# Patient Record
Sex: FEMALE | Race: BLACK OR AFRICAN AMERICAN | NOT HISPANIC OR LATINO | URBAN - METROPOLITAN AREA
[De-identification: names, ages, dates, MRNs, and addresses within clinical notes are randomized per-mention and may not be internally consistent; named-entity substitution may affect disease eponyms.]

---

## 2017-09-07 ENCOUNTER — FOLLOW UP ESTABLISHED (OUTPATIENT)
Dept: URBAN - METROPOLITAN AREA CLINIC 10 | Facility: CLINIC | Age: 72
End: 2017-09-07
Payer: MEDICARE

## 2017-09-07 DIAGNOSIS — H40.1131 PRIMARY OPEN-ANGLE GLAUCOMA, BILATERAL, MILD STAGE: Primary | ICD-10-CM

## 2017-09-07 PROCEDURE — 99213 OFFICE O/P EST LOW 20 MIN: CPT | Performed by: OPHTHALMOLOGY

## 2017-09-07 PROCEDURE — 92250 FUNDUS PHOTOGRAPHY W/I&R: CPT | Performed by: OPHTHALMOLOGY

## 2017-09-07 ASSESSMENT — INTRAOCULAR PRESSURE
OS: 14
OD: 15

## 2018-04-03 ENCOUNTER — FOLLOW UP ESTABLISHED (OUTPATIENT)
Dept: URBAN - METROPOLITAN AREA CLINIC 24 | Facility: CLINIC | Age: 73
End: 2018-04-03
Payer: MEDICARE

## 2018-04-03 PROCEDURE — 92012 INTRM OPH EXAM EST PATIENT: CPT | Performed by: OPHTHALMOLOGY

## 2018-04-03 PROCEDURE — 92083 EXTENDED VISUAL FIELD XM: CPT | Performed by: OPHTHALMOLOGY

## 2018-04-03 ASSESSMENT — INTRAOCULAR PRESSURE
OD: 15
OS: 15

## 2019-04-15 ENCOUNTER — FOLLOW UP ESTABLISHED (OUTPATIENT)
Dept: URBAN - METROPOLITAN AREA CLINIC 10 | Facility: CLINIC | Age: 74
End: 2019-04-15
Payer: MEDICARE

## 2019-04-15 PROCEDURE — 92012 INTRM OPH EXAM EST PATIENT: CPT | Performed by: OPHTHALMOLOGY

## 2019-04-15 ASSESSMENT — INTRAOCULAR PRESSURE
OS: 13
OD: 14

## 2019-10-01 ENCOUNTER — FOLLOW UP ESTABLISHED (OUTPATIENT)
Dept: URBAN - METROPOLITAN AREA CLINIC 24 | Facility: CLINIC | Age: 74
End: 2019-10-01
Payer: MEDICARE

## 2019-10-01 PROCEDURE — 92012 INTRM OPH EXAM EST PATIENT: CPT | Performed by: OPHTHALMOLOGY

## 2019-10-01 PROCEDURE — 92083 EXTENDED VISUAL FIELD XM: CPT | Performed by: OPHTHALMOLOGY

## 2019-10-01 ASSESSMENT — INTRAOCULAR PRESSURE
OD: 18
OS: 16

## 2020-01-20 ENCOUNTER — FOLLOW UP ESTABLISHED (OUTPATIENT)
Dept: URBAN - METROPOLITAN AREA CLINIC 44 | Facility: CLINIC | Age: 75
End: 2020-01-20
Payer: MEDICARE

## 2020-01-20 PROCEDURE — 92012 INTRM OPH EXAM EST PATIENT: CPT | Performed by: OPHTHALMOLOGY

## 2020-01-20 PROCEDURE — 92133 CPTRZD OPH DX IMG PST SGM ON: CPT | Performed by: OPHTHALMOLOGY

## 2020-01-20 RX ORDER — LATANOPROST 50 UG/ML
0.005 % SOLUTION OPHTHALMIC
Qty: 3 | Refills: 3 | Status: INACTIVE
Start: 2020-01-20 | End: 2020-11-23

## 2020-01-20 RX ORDER — TIMOLOL MALEATE 5 MG/ML
0.5 % SOLUTION/ DROPS OPHTHALMIC
Qty: 3 | Refills: 3 | Status: INACTIVE
Start: 2020-01-20 | End: 2021-03-15

## 2020-01-20 ASSESSMENT — INTRAOCULAR PRESSURE
OS: 17
OD: 19

## 2020-05-19 ENCOUNTER — FOLLOW UP ESTABLISHED (OUTPATIENT)
Dept: URBAN - METROPOLITAN AREA CLINIC 24 | Facility: CLINIC | Age: 75
End: 2020-05-19
Payer: MEDICARE

## 2020-05-19 PROCEDURE — 92250 FUNDUS PHOTOGRAPHY W/I&R: CPT | Performed by: OPHTHALMOLOGY

## 2020-05-19 PROCEDURE — 92012 INTRM OPH EXAM EST PATIENT: CPT | Performed by: OPHTHALMOLOGY

## 2020-05-19 ASSESSMENT — INTRAOCULAR PRESSURE
OD: 14
OS: 14

## 2020-12-01 ENCOUNTER — FOLLOW UP ESTABLISHED (OUTPATIENT)
Dept: URBAN - METROPOLITAN AREA CLINIC 24 | Facility: CLINIC | Age: 75
End: 2020-12-01
Payer: MEDICARE

## 2020-12-01 PROCEDURE — 92083 EXTENDED VISUAL FIELD XM: CPT | Performed by: OPHTHALMOLOGY

## 2020-12-01 PROCEDURE — 92012 INTRM OPH EXAM EST PATIENT: CPT | Performed by: OPHTHALMOLOGY

## 2020-12-01 ASSESSMENT — INTRAOCULAR PRESSURE
OD: 14
OS: 12

## 2021-05-25 ENCOUNTER — OFFICE VISIT (OUTPATIENT)
Dept: URBAN - METROPOLITAN AREA CLINIC 24 | Facility: CLINIC | Age: 76
End: 2021-05-25
Payer: MEDICARE

## 2021-05-25 PROCEDURE — 99213 OFFICE O/P EST LOW 20 MIN: CPT | Performed by: OPHTHALMOLOGY

## 2021-05-25 PROCEDURE — 92133 CPTRZD OPH DX IMG PST SGM ON: CPT | Performed by: OPHTHALMOLOGY

## 2021-05-25 ASSESSMENT — INTRAOCULAR PRESSURE
OS: 13
OD: 14

## 2021-05-25 NOTE — IMPRESSION/PLAN
Impression: Primary open-angle glaucoma, mild stage, bilateral Plan: PT HAS POAG OU  AND PXG OD         PACHS: 552 // 548 (01/15/10) REFERRED BY DR. MCDONOUGH OD
TARGET IOP LOW TEENS OR LESS OU
PT DENIES LUNG DZ
PT DENIES SULFA ALLERGY
RECOMMEND 1. CONTINUE LATANAPROST  OU QPM
2. CONTINUE TIMOLOL 1/2 OD QAM
3. F/U IOP CHECK 6  MONTHS WITH CONSIDERATION OF SLT IF IOP REMAINS ABOVE LOW TEENS
4. F/U WITH DR. MCDONOUGH FOR COMPREHENSIVE CARE

## 2021-12-07 ENCOUNTER — OFFICE VISIT (OUTPATIENT)
Dept: URBAN - METROPOLITAN AREA CLINIC 24 | Facility: CLINIC | Age: 76
End: 2021-12-07
Payer: MEDICARE

## 2021-12-07 PROCEDURE — 92083 EXTENDED VISUAL FIELD XM: CPT | Performed by: OPHTHALMOLOGY

## 2021-12-07 PROCEDURE — 99214 OFFICE O/P EST MOD 30 MIN: CPT | Performed by: OPHTHALMOLOGY

## 2021-12-07 ASSESSMENT — INTRAOCULAR PRESSURE
OD: 17
OS: 16

## 2021-12-07 NOTE — IMPRESSION/PLAN
Impression: Primary open-angle glaucoma, mild stage, bilateral Plan: PT HAS POAG OU  AND PXG OD         PACHS: 552 // 548 (01/15/10) REFERRED BY DR. MCDONOUGH OD
TARGET IOP LOW TEENS OR LESS OU
PT DENIES LUNG DZ
PT DENIES SULFA ALLERGY
RECOMMEND 1. CONTINUE LATANAPROST  OU QPM
2. CONTINUE TIMOLOL 1/2 OD QAM
3. THE PT HAS DEVELOPED VISUALLY SIGNIFICANT CATARACT OD AND EARLY CATARACT OS
4. THE PT WOULD LIKE TO PROCEED WITH CATARACT EVALUATION WITH STRONG CONSIDERATION OF CATARACT SURGERY OD AND IF NEEDED OS. SCHEDULE CATARACT EVALUATION IN PHX OFFICE WITH DR ALARCON OR DR. DÍAZ. THE PT HAS PSEUDOEXFOLIATION OD WHICH NEEDS TO BE CAREFULLY MONITORED WHEN SHE PROCEEDS WITH CATARACT SURGERY OD
5. CONTINUE CARE  WITH DR. MCDONOUGH FOLLOWING CATARACT SURGERY

## 2021-12-09 ENCOUNTER — OFFICE VISIT (OUTPATIENT)
Dept: URBAN - METROPOLITAN AREA CLINIC 10 | Facility: CLINIC | Age: 76
End: 2021-12-09
Payer: MEDICARE

## 2021-12-09 DIAGNOSIS — H43.813 VITREOUS DEGENERATION, BILATERAL: ICD-10-CM

## 2021-12-09 PROCEDURE — 99204 OFFICE O/P NEW MOD 45 MIN: CPT | Performed by: OPTOMETRIST

## 2021-12-09 ASSESSMENT — VISUAL ACUITY
OS: 20/20
OD: 20/40

## 2021-12-09 ASSESSMENT — INTRAOCULAR PRESSURE
OS: 16
OD: 16

## 2021-12-09 NOTE — IMPRESSION/PLAN
Impression: Combined forms of age-related cataract, bilateral: H25.813. Plan:  Discussed cataracts with patient. Discussed treatment options. Surgical treatment is recommended. Surgical risks and benefits discussed. Patient elects surgical treatment. Recommend surgery OU, OD first. Patient is candidate/interested in standard lens, ORA, LenSx, toric lens/astigmatism correction. Aim OD: -0.25. Aim OS: -0.25. Consider MIGS. Glaucoma surgeon recommended. Needs Retina Clearance. Patient will need glasses for full time wear. Patient will need glasses for all near work, including computer. Patient advised of implications of lost myopia. Patient will need glasses for distance work (if near aim).

## 2021-12-09 NOTE — IMPRESSION/PLAN
Impression: Primary open-angle glaucoma, mild stage, bilateral Plan: PT HAS POAG OU  AND PXG OD         PACHS: 552 // 548 (01/15/10) REFERRED BY DR. MCDONOUGH OD
TARGET IOP LOW TEENS OR LESS OU
PT DENIES LUNG DZ
PT DENIES SULFA ALLERGY
RECOMMEND 1. CONTINUE LATANAPROST  OU QPM
2. CONTINUE TIMOLOL 1/2 OD QAM
3. THE PT HAS PSEUDOEXFOLIATION OD WHICH NEEDS TO BE CAREFULLY MONITORED WHEN SHE PROCEEDS WITH CATARACT SURGERY OD
4. CONTINUE GLAUCOMA CARE WITH DR. Shadi Queen
5. CONSIDER MIGS, GLAUCOMA SURGEON RECOMMENDED 6. CONTINUE CARE WITH DR. MCDONOUGH FOLLOWING CATARACT SURGERY

## 2022-01-06 ENCOUNTER — OFFICE VISIT (OUTPATIENT)
Dept: URBAN - METROPOLITAN AREA CLINIC 10 | Facility: CLINIC | Age: 77
End: 2022-01-06
Payer: MEDICARE

## 2022-01-06 DIAGNOSIS — H40.1133 PRIMARY OPEN-ANGLE GLAUCOMA, SEVERE STAGE, BILATERAL: ICD-10-CM

## 2022-01-06 PROCEDURE — 92134 CPTRZ OPH DX IMG PST SGM RTA: CPT | Performed by: OPHTHALMOLOGY

## 2022-01-06 PROCEDURE — 99214 OFFICE O/P EST MOD 30 MIN: CPT | Performed by: OPHTHALMOLOGY

## 2022-01-06 ASSESSMENT — INTRAOCULAR PRESSURE
OS: 24
OD: 25

## 2022-01-06 NOTE — IMPRESSION/PLAN
Impression: Age-related nuclear cataract, bilateral: H25.13.  Plan: remove both cataracts - DR Tripp Holt

## 2022-01-06 NOTE — IMPRESSION/PLAN
Impression: Primary open-angle glaucoma, severe stage, bilateral: H40.1133.  Plan: DR Hart Redbruceh drops continue

## 2022-03-07 ENCOUNTER — TESTING ONLY (OUTPATIENT)
Dept: URBAN - METROPOLITAN AREA CLINIC 10 | Facility: CLINIC | Age: 77
End: 2022-03-07
Payer: MEDICARE

## 2022-03-07 DIAGNOSIS — Z01.818 ENCOUNTER FOR OTHER PREPROCEDURAL EXAMINATION: Primary | ICD-10-CM

## 2022-03-07 PROCEDURE — 99202 OFFICE O/P NEW SF 15 MIN: CPT | Performed by: PHYSICIAN ASSISTANT

## 2022-03-15 ENCOUNTER — OFFICE VISIT (OUTPATIENT)
Dept: URBAN - METROPOLITAN AREA CLINIC 10 | Facility: CLINIC | Age: 77
End: 2022-03-15
Payer: MEDICARE

## 2022-03-15 DIAGNOSIS — H25.813 COMBINED FORMS OF AGE-RELATED CATARACT, BILATERAL: Primary | ICD-10-CM

## 2022-03-15 DIAGNOSIS — H52.203 UNSPECIFIED ASTIGMATISM, BILATERAL: ICD-10-CM

## 2022-03-15 DIAGNOSIS — H52.13 MYOPIA, BILATERAL: ICD-10-CM

## 2022-03-15 PROCEDURE — 92020 GONIOSCOPY: CPT | Performed by: OPHTHALMOLOGY

## 2022-03-15 PROCEDURE — 99214 OFFICE O/P EST MOD 30 MIN: CPT | Performed by: OPHTHALMOLOGY

## 2022-03-15 RX ORDER — DICLOFENAC SODIUM 1 MG/ML
0.1 % SOLUTION/ DROPS OPHTHALMIC
Qty: 5 | Refills: 2 | Status: INACTIVE
Start: 2022-03-15 | End: 2022-03-15

## 2022-03-15 RX ORDER — PREDNISOLONE ACETATE 10 MG/ML
1 % SUSPENSION/ DROPS OPHTHALMIC
Qty: 5 | Refills: 2 | Status: INACTIVE
Start: 2022-03-15 | End: 2022-03-15

## 2022-03-15 RX ORDER — DORZOLAMIDE HYDROCHLORIDE AND TIMOLOL MALEATE 20; 5 MG/ML; MG/ML
SOLUTION/ DROPS OPHTHALMIC
Qty: 15 | Refills: 2 | Status: ACTIVE
Start: 2022-03-15

## 2022-03-15 ASSESSMENT — VISUAL ACUITY
OS: 20/20
OD: 20/40

## 2022-03-15 ASSESSMENT — KERATOMETRY
OD: 43.25
OS: 44.13

## 2022-03-15 ASSESSMENT — INTRAOCULAR PRESSURE
OD: 36
OS: 32

## 2022-03-15 NOTE — IMPRESSION/PLAN
Impression: Combined forms of age-related cataract, bilateral: H25.813. 
Pseudoexfoliation  Plan: See [address]: cancel cat surgery , will need to adjust drops first

## 2022-03-15 NOTE — IMPRESSION/PLAN
Impression: Primary open-angle glaucoma, severe stage, bilateral: H40.3893. (unknown) Family Hx of Glaucoma, (-) Sulfa Allergy, (-)Heart or Lung Problems, (-) Sleep Apnea,  (-) Hx of Migraines  ; PEX OU  Plan: PLAN: On Timolol QAM OD, and Latanoprost QHS OU, test reviewed, IOP is too elevated, hold on surgery for now, adjust meds: use xal qhs ou, stop piotr ,add cosopt bid ou and rtc in 3-5 days for iop recheck ; then will schedule eval w Dr Lucille Pablo for cat sx/migs ;   gtt sheet given 3/15/22 TESTS: Reviewed Discussed Glaucoma diagnosis in detail with patient. Emphasized and explain complaince. poor compliance can lead to blindness.

## 2022-03-21 ENCOUNTER — OFFICE VISIT (OUTPATIENT)
Dept: URBAN - METROPOLITAN AREA CLINIC 10 | Facility: CLINIC | Age: 77
End: 2022-03-21
Payer: MEDICARE

## 2022-03-21 PROCEDURE — 99214 OFFICE O/P EST MOD 30 MIN: CPT | Performed by: STUDENT IN AN ORGANIZED HEALTH CARE EDUCATION/TRAINING PROGRAM

## 2022-03-21 ASSESSMENT — INTRAOCULAR PRESSURE
OD: 23
OS: 14

## 2022-03-21 NOTE — IMPRESSION/PLAN
Impression: Combined forms of age-related cataract, bilateral: H25.813. 
Pseudoexfoliation Plan: Once pressures better controlled okay to proceed with sx

## 2022-03-21 NOTE — IMPRESSION/PLAN
Impression: Primary open-angle glaucoma, severe stage, bilateral: H40.1133. (unknown) Family Hx of Glaucoma, (-) Sulfa Allergy, (-)Heart or Lung Problems, (-) Sleep Apnea,  (-) Hx of Migraines  ; PEX OU Plan: PLAN: On Cosopt BID OU, and Latanoprost QHS OU, test reviewed, IOP is too elevated, hold on surgery for now. IOP lowered to a safer level today (slightly elevated OD still) Continue as scheduled with Dr. Gemraine Feng

## 2022-03-30 ENCOUNTER — OFFICE VISIT (OUTPATIENT)
Dept: URBAN - METROPOLITAN AREA CLINIC 30 | Facility: CLINIC | Age: 77
End: 2022-03-30
Payer: MEDICARE

## 2022-03-30 DIAGNOSIS — H25.13 AGE-RELATED NUCLEAR CATARACT, BILATERAL: ICD-10-CM

## 2022-03-30 DIAGNOSIS — H04.123 DRY EYE SYNDROME OF BILATERAL LACRIMAL GLANDS: ICD-10-CM

## 2022-03-30 PROCEDURE — 99214 OFFICE O/P EST MOD 30 MIN: CPT | Performed by: OPHTHALMOLOGY

## 2022-03-30 PROCEDURE — 76514 ECHO EXAM OF EYE THICKNESS: CPT | Performed by: OPHTHALMOLOGY

## 2022-03-30 PROCEDURE — 92020 GONIOSCOPY: CPT | Performed by: OPHTHALMOLOGY

## 2022-03-30 ASSESSMENT — INTRAOCULAR PRESSURE
OS: 26
OD: 13

## 2022-03-30 NOTE — IMPRESSION/PLAN
Impression: Age-related nuclear cataract, bilateral: H25.13. Plan: Pt would like to try new drop regime for 1 month then if IOP still not under control plan for below. (ODTHEN OS)( NEAR -2.50-3.00 AIM Os:  DEXYCU 1st choice, + Block ORA?, LenSx?, + MIGS OU (OMNI WITH PARTIAL OTOMY, HYDRUS), TORIC?))

10 - Min Discussed cataract diagnosis with the patient. Appropriate testing ordered for cataract diagnosis prior to Preop. Risks and benefits of surgical treatment were discussed and understood. Patient desires surgical treatment. Discussed lens options with pt and pt is ok with wearing glasses after surgery. Patient desires surgery to proceed with surgery OD THEN OS. Both eyes examined, medically necessary due to impact in activities of daily living. Discussed with pt limitations of MIGS device and it does not replace  Glaucoma eye drops and would have to continue treatment and would still need glasses after surgery. Discussed higher risks with smaller pupil and discussed iris stretch and higher risks of bleeding.  Discussed there is a chance of developing capsular haze after surgery, which may be corrected with laser/yag

## 2022-03-30 NOTE — IMPRESSION/PLAN
Impression: Primary open-angle glaucoma, severe stage, bilateral: H40.1133.
(+)PEX OU Plan: Pt has Glaucoma    Gonio : ss 3+ ou       Pachs: 532/540      Today's IOP :  13/26       Tmax  :  36/32 Target IOP mid teens or less ou Pt denies Fhx of Glaucoma Left eye is the better seeing eye HVF OD Dense loss OS Central change C/D: .9x.9 // .8x.8
OCT: 77/73 Pt denies Sulfa Allergy // Pt denies Lung /Heart dx Plan :
1. Continue Cosopt BID OU - just started with Dr. Nieves [place] Latanoprost QHS OU Discussed details about Glaucoma and that without proper control of pressures irreversible blindness can occur. Patient understands risks. Emphasize compliance with drop and without compliance vision loss progression can occur. 
2. IOP elevated OS - will check IOP in 1 month and if not under control consider other options

## 2022-04-29 ENCOUNTER — OFFICE VISIT (OUTPATIENT)
Dept: URBAN - METROPOLITAN AREA CLINIC 10 | Facility: CLINIC | Age: 77
End: 2022-04-29
Payer: MEDICARE

## 2022-04-29 DIAGNOSIS — H40.1133 PRIMARY OPEN-ANGLE GLAUCOMA, SEVERE STAGE, BILATERAL: Primary | ICD-10-CM

## 2022-04-29 DIAGNOSIS — H25.12 AGE-RELATED NUCLEAR CATARACT, LEFT EYE: ICD-10-CM

## 2022-04-29 DIAGNOSIS — H25.13 AGE-RELATED NUCLEAR CATARACT, BILATERAL: ICD-10-CM

## 2022-04-29 PROCEDURE — 99214 OFFICE O/P EST MOD 30 MIN: CPT | Performed by: OPHTHALMOLOGY

## 2022-04-29 ASSESSMENT — INTRAOCULAR PRESSURE
OS: 13
OD: 23

## 2022-04-29 NOTE — IMPRESSION/PLAN
Impression: Age-related nuclear cataract, bilateral: H25.13.
h/o CL use Plan: (OD THEN OS)( NEAR -2.50 to -3.00 AIM OU:  DEXYCU , + Block, + MIGS OU (OMNI WITH PARTIAL OTOMY, HYDRUS),- 10 Min No Ora, No Lensx, No TORIC Patient declines all upgrades)) Discussed cataract diagnosis with the patient. Appropriate testing ordered for cataract diagnosis prior to Preop. Risks and benefits of surgical treatment were discussed and understood. Patient desires surgical treatment. Discussed lens options with pt and pt is ok with wearing glasses after surgery. Patient desires surgery to proceed with surgery OD THEN OS. Both eyes examined, medically necessary due to impact in activities of daily living. Discussed with pt limitations of MIGS device and it does not replace  Glaucoma eye drops and would have to continue treatment and would still need glasses after surgery. Discussed higher risks with smaller pupil and discussed iris stretch and higher risks of bleeding.  Discussed there is a chance of developing capsular haze after surgery, which may be corrected with laser/yag

## 2022-04-29 NOTE — IMPRESSION/PLAN
Impression: Primary open-angle glaucoma, severe stage, bilateral: H40.1133.
(+)PEX OU Plan: Pt has Glaucoma    Gonio : ss 3+ ou       Pachs: 532/540      Today's IOP :  23/13       Tmax  :  36/32 Target IOP mid teens or less ou Pt denies Fhx of Glaucoma Left eye is the better seeing eye HVF OD Dense loss OS Central change C/D: .9x.9 // .8x.8
OCT: 77/73 Pt denies Sulfa Allergy // Pt denies Lung /Heart dx Plan :
1. Continue Cosopt BID OU Latanoprost QHS OU Discussed details about Glaucoma and that without proper control of pressures irreversible blindness can occur. Patient understands risks. Emphasize compliance with drop and without compliance vision loss progression can occur. 
2. IOP elevated OD - will plan CEIOL with MIGS OD

## 2022-05-16 ENCOUNTER — ADULT PHYSICAL (OUTPATIENT)
Dept: URBAN - METROPOLITAN AREA CLINIC 10 | Facility: CLINIC | Age: 77
End: 2022-05-16
Payer: MEDICARE

## 2022-05-16 DIAGNOSIS — Z01.818 ENCOUNTER FOR OTHER PREPROCEDURAL EXAMINATION: Primary | ICD-10-CM

## 2022-05-16 PROCEDURE — 99213 OFFICE O/P EST LOW 20 MIN: CPT | Performed by: PHYSICIAN ASSISTANT

## 2022-05-23 ENCOUNTER — SURGERY (OUTPATIENT)
Dept: URBAN - METROPOLITAN AREA SURGERY 5 | Facility: SURGERY | Age: 77
End: 2022-05-23
Payer: MEDICARE

## 2022-05-23 PROCEDURE — 66174 TRLUML DIL AQ O/F CAN W/O ST: CPT | Performed by: OPHTHALMOLOGY

## 2022-05-24 ENCOUNTER — POST-OPERATIVE VISIT (OUTPATIENT)
Dept: URBAN - METROPOLITAN AREA CLINIC 10 | Facility: CLINIC | Age: 77
End: 2022-05-24
Payer: MEDICARE

## 2022-05-24 DIAGNOSIS — Z96.1 PRESENCE OF INTRAOCULAR LENS: Primary | ICD-10-CM

## 2022-05-24 PROCEDURE — 99024 POSTOP FOLLOW-UP VISIT: CPT | Performed by: OPTOMETRIST

## 2022-05-24 ASSESSMENT — INTRAOCULAR PRESSURE: OD: 16

## 2022-05-31 ENCOUNTER — POST-OPERATIVE VISIT (OUTPATIENT)
Dept: URBAN - METROPOLITAN AREA CLINIC 10 | Facility: CLINIC | Age: 77
End: 2022-05-31
Payer: MEDICARE

## 2022-05-31 DIAGNOSIS — Z96.1 PRESENCE OF INTRAOCULAR LENS: Primary | ICD-10-CM

## 2022-05-31 PROCEDURE — 99024 POSTOP FOLLOW-UP VISIT: CPT | Performed by: STUDENT IN AN ORGANIZED HEALTH CARE EDUCATION/TRAINING PROGRAM

## 2022-05-31 RX ORDER — DORZOLAMIDE HYDROCHLORIDE AND TIMOLOL MALEATE 20; 5 MG/ML; MG/ML
SOLUTION/ DROPS OPHTHALMIC
Qty: 15 | Refills: 2 | Status: ACTIVE
Start: 2022-05-31

## 2022-05-31 ASSESSMENT — INTRAOCULAR PRESSURE
OD: 16
OS: 16

## 2022-05-31 NOTE — IMPRESSION/PLAN
Impression:  Presence of intraocular lens  Z96.1. Post operative instructions reviewed - Plan: Reviewed post-op instructions. RTC if any pain or sudden changes to vision. 
Vision remains reduced but Mac OCT WNL, trace haze but off axis, very poor TF with PEK OD

## 2022-06-16 ENCOUNTER — SURGERY (OUTPATIENT)
Dept: URBAN - METROPOLITAN AREA SURGERY 5 | Facility: SURGERY | Age: 77
End: 2022-06-16
Payer: MEDICARE

## 2022-06-16 DIAGNOSIS — H25.12 AGE-RELATED NUCLEAR CATARACT, LEFT EYE: Primary | ICD-10-CM

## 2022-06-16 DIAGNOSIS — H40.1133 PRIMARY OPEN-ANGLE GLAUCOMA, BILATERAL, SEVERE STAGE: ICD-10-CM

## 2022-06-16 PROCEDURE — 66174 TRLUML DIL AQ O/F CAN W/O ST: CPT | Performed by: OPHTHALMOLOGY

## 2022-06-17 ENCOUNTER — POST-OPERATIVE VISIT (OUTPATIENT)
Dept: URBAN - METROPOLITAN AREA CLINIC 10 | Facility: CLINIC | Age: 77
End: 2022-06-17
Payer: MEDICARE

## 2022-06-17 DIAGNOSIS — Z48.810 ENCOUNTER FOR SURGICAL AFTERCARE FOLLOWING SURGERY ON A SENSE ORGAN: Primary | ICD-10-CM

## 2022-06-17 PROCEDURE — 99024 POSTOP FOLLOW-UP VISIT: CPT | Performed by: STUDENT IN AN ORGANIZED HEALTH CARE EDUCATION/TRAINING PROGRAM

## 2022-06-17 ASSESSMENT — INTRAOCULAR PRESSURE: OS: 14

## 2022-06-17 NOTE — IMPRESSION/PLAN
Impression: S/P Cataract Extraction by phacoemulsification with IOL placement; OMNI/Hydrus OS - 1 Day. Encounter for surgical aftercare following surgery on a sense organ  Z48.810. Post operative instructions reviewed - Plan: Reviewed post-op instructions. RTC if any pain or sudden changes to vision.

## 2022-07-07 ENCOUNTER — POST-OPERATIVE VISIT (OUTPATIENT)
Dept: URBAN - METROPOLITAN AREA CLINIC 10 | Facility: CLINIC | Age: 77
End: 2022-07-07
Payer: MEDICARE

## 2022-07-07 DIAGNOSIS — Z96.1 PRESENCE OF INTRAOCULAR LENS: Primary | ICD-10-CM

## 2022-07-07 DIAGNOSIS — Z48.810 ENCOUNTER FOR SURGICAL AFTERCARE FOLLOWING SURGERY ON THE SENSE ORGANS: ICD-10-CM

## 2022-07-07 DIAGNOSIS — H40.1133 PRIMARY OPEN-ANGLE GLAUCOMA, SEVERE STAGE, BILATERAL: ICD-10-CM

## 2022-07-07 PROCEDURE — 99024 POSTOP FOLLOW-UP VISIT: CPT | Performed by: OPTOMETRIST

## 2022-07-07 ASSESSMENT — INTRAOCULAR PRESSURE
OS: 17
OD: 15

## 2022-07-07 ASSESSMENT — VISUAL ACUITY
OD: 20/200
OS: 20/25

## 2022-07-07 NOTE — IMPRESSION/PLAN
Impression: Presence of intraocular lens  Z96.1. Post operative instructions reviewed - Plan: Healed well OU. Glaucoma limits BCVA OD. Mac OCT difficulty due to poor fixation. Pt. will return to Dr. Jess Mohan for glasses. RTC 2 months for IOP c Dr. Lou Beck.

## 2022-07-07 NOTE — IMPRESSION/PLAN
Impression: Primary open-angle glaucoma, severe stage, bilateral: H40.1133.
(+)PEX OU Plan: Pt has Glaucoma    Gonio : ss 3+ ou       Pachs: 532/540     Tmax  :  36/32 Target IOP mid teens or less ou Pt denies Fhx of Glaucoma Left eye is the better seeing eye HVF OD Dense loss OS Central change C/D: .9x.9 // .8x.8
OCT: 77/73 Pt denies Sulfa Allergy // Pt denies Lung /Heart dx Plan :
1. IOP improved s/p cataract sx c hydrus OU. Cosopt BID OU Latanoprost QHS OU 2. RTC 2 months for IOP check c Dr. Dwayne Scheuermann.

## 2022-09-26 ENCOUNTER — OFFICE VISIT (OUTPATIENT)
Dept: URBAN - METROPOLITAN AREA CLINIC 10 | Facility: CLINIC | Age: 77
End: 2022-09-26

## 2022-09-26 DIAGNOSIS — Z96.1 PRESENCE OF INTRAOCULAR LENS: ICD-10-CM

## 2022-09-26 DIAGNOSIS — H40.1121 PRIMARY OPEN-ANGLE GLAUCOMA, MILD STAGE, LEFT EYE: ICD-10-CM

## 2022-09-26 DIAGNOSIS — H40.1113 PRIMARY OPEN-ANGLE GLAUCOMA, SEVERE STAGE, RIGHT EYE: Primary | ICD-10-CM

## 2022-09-26 PROCEDURE — 92020 GONIOSCOPY: CPT | Performed by: STUDENT IN AN ORGANIZED HEALTH CARE EDUCATION/TRAINING PROGRAM

## 2022-09-26 PROCEDURE — 92083 EXTENDED VISUAL FIELD XM: CPT | Performed by: STUDENT IN AN ORGANIZED HEALTH CARE EDUCATION/TRAINING PROGRAM

## 2022-09-26 PROCEDURE — 99214 OFFICE O/P EST MOD 30 MIN: CPT | Performed by: STUDENT IN AN ORGANIZED HEALTH CARE EDUCATION/TRAINING PROGRAM

## 2022-09-26 RX ORDER — PREDNISOLONE ACETATE 10 MG/ML
1 % SUSPENSION/ DROPS OPHTHALMIC
Qty: 5 | Refills: 0 | Status: ACTIVE
Start: 2022-09-26

## 2022-09-26 ASSESSMENT — INTRAOCULAR PRESSURE
OS: 14
OD: 18

## 2022-09-26 NOTE — IMPRESSION/PLAN
Impression: Primary open-angle glaucoma, severe stage, right eye: H40.1113. Plan: Ocular Surgical History: s/p Phaco/Hydrus OU Pachy: 532/540 Tmax: 36/32 Target IOP: low teens OD & mid teens OS Med Allergies: none Heart / Lung / Kidney disease/sulfa allergy: Pt. denies Gonioscopy: OD- open to CBB 3+ tmp, sup hydrus in good position ; OS- open to CBB w/ 3+ tmp, inf hydrus in good position OCT *unable OU today* (5/25/21): OD 77, sup thinning; OS 73, sup thinning HVF(9/26/22): MD OD -24, general depression ; OS -3, wnl
C/D: .9/.75 Better seeing eye: OS
IOP Today: 18/14 Plan: IOP today is high OD and stable OS. VF appears worse OD and stable OS. Believe that IOP is too high OD for the amount of glaucoma present. Recommend lowering IOP. Offered patient option of adding another drop vs. SLT. Discussed the need for SLT vs additional medicine to try and achieve better pressure control. Selective Laser Trabeculoplasty risks, benefits, alternatives to laser discussed with patient, including inflammation, IOP spike, light sensitivity. Discussed it may take anywhere from 6wks to 3mos to see the full effect of the laser. Patient understands that SLT is not a replacement for current drop therapy. All questions answered. Patient understands and desires to proceed. Will not make any changes to treatment at this time. Drops:  Continue: Cosopt BID OU & Latanoprost QHS OU Erx'd post op drop of PF TID x5days. See Precious Gilmore to schedule SLT OD. Will need appointment 6wk for IOP check. Discussed natural history of glaucoma and that irreversible vision loss can occur without adequate IOP control. Emphasized compliance with eyedrops and other treatment described above.

## 2022-10-10 ENCOUNTER — SURGERY (OUTPATIENT)
Dept: URBAN - METROPOLITAN AREA SURGERY 5 | Facility: SURGERY | Age: 77
End: 2022-10-10
Payer: MEDICARE

## 2022-10-10 PROCEDURE — 65855 TRABECULOPLASTY LASER SURG: CPT | Performed by: STUDENT IN AN ORGANIZED HEALTH CARE EDUCATION/TRAINING PROGRAM

## 2022-11-21 ENCOUNTER — OFFICE VISIT (OUTPATIENT)
Dept: URBAN - METROPOLITAN AREA CLINIC 10 | Facility: CLINIC | Age: 77
End: 2022-11-21
Payer: MEDICARE

## 2022-11-21 DIAGNOSIS — H40.1121 PRIMARY OPEN-ANGLE GLAUCOMA, MILD STAGE, LEFT EYE: ICD-10-CM

## 2022-11-21 DIAGNOSIS — H40.1113 PRIMARY OPEN-ANGLE GLAUCOMA, SEVERE STAGE, RIGHT EYE: Primary | ICD-10-CM

## 2022-11-21 PROCEDURE — 99213 OFFICE O/P EST LOW 20 MIN: CPT | Performed by: STUDENT IN AN ORGANIZED HEALTH CARE EDUCATION/TRAINING PROGRAM

## 2022-11-21 RX ORDER — NETARSUDIL 0.2 MG/ML
0.02 % SOLUTION/ DROPS OPHTHALMIC; TOPICAL
Qty: 2.5 | Refills: 5 | Status: ACTIVE
Start: 2022-11-21

## 2022-11-21 ASSESSMENT — INTRAOCULAR PRESSURE
OD: 18
OS: 13

## 2022-11-21 NOTE — IMPRESSION/PLAN
Impression: Primary open-angle glaucoma, severe stage, right eye: H40.1113.
(+) PEX OU Plan: Ocular Surgical History: s/p Phaco/Hydrus OU/SLT OD Pachy: 532/540 Tmax: 36/32 Target IOP: low teens OD & mid teens OS Med Allergies: none Heart / Lung / Kidney disease/sulfa allergy: Pt. denies Gonioscopy: OD- open to CBB 3+ tmp, sup hydrus in good position /OS- open to CBB w/ 3+ tmp, inf hydrus in good position OCT *unable OU today* (5/25/21): OD: 77, sup thinning/OS: 73, sup thinning HVF(9/26/22): OD: MD -24, general depression - worse / OS: MD -3, wnl
C/D: .9/.75 Better seeing eye: OS
IOP Today: 18/13 Plan: IOP today is unchanged since SLT OD, and still above target OD. IOP OS is stable, and at target. Inflammation is still present, but minimal. Recommend lowering IOP OD. Drops: RESTART Prednisolone BID x3 days, then QD x 3 days, then stop, START Rhopressa QHS OD, CONTINUE Dorzolamide-Timolol BID OU & Latanoprost QHS OU. Drop sheet was given and explained. Patient to use drops as directed, EVEN on mornings of appointments. Poor compliance can lead to blindness. Discussed natural history of glaucoma and that irreversible vision loss can occur without adequate IOP control.

## 2023-01-09 ENCOUNTER — OFFICE VISIT (OUTPATIENT)
Dept: URBAN - METROPOLITAN AREA CLINIC 10 | Facility: CLINIC | Age: 78
End: 2023-01-09
Payer: MEDICARE

## 2023-01-09 DIAGNOSIS — H40.1113 PRIMARY OPEN-ANGLE GLAUCOMA, SEVERE STAGE, RIGHT EYE: Primary | ICD-10-CM

## 2023-01-09 DIAGNOSIS — H40.1121 PRIMARY OPEN-ANGLE GLAUCOMA, MILD STAGE, LEFT EYE: ICD-10-CM

## 2023-01-09 PROCEDURE — 99213 OFFICE O/P EST LOW 20 MIN: CPT | Performed by: STUDENT IN AN ORGANIZED HEALTH CARE EDUCATION/TRAINING PROGRAM

## 2023-01-09 RX ORDER — BRIMONIDINE TARTRATE 2 MG/ML
0.2 % SOLUTION/ DROPS OPHTHALMIC
Qty: 10 | Refills: 5 | Status: ACTIVE
Start: 2023-01-09

## 2023-01-09 ASSESSMENT — INTRAOCULAR PRESSURE
OD: 19
OS: 12

## 2023-01-09 NOTE — IMPRESSION/PLAN
Impression: Primary open-angle glaucoma, severe stage, right eye: H40.1113.
(+) PEX OU Plan: Ocular Surgical History: s/p Phaco/Hydrus OU, SLT OD Pachy: 532/540 Tmax: 36/32 Target IOP: low teens OD & mid teens OS Med Allergies: none Heart / Lung / Kidney disease/sulfa allergy: Pt. denies Gonioscopy: open to CBB 3+ tmp, hydrus ou
OCT (5/25/21): OD: 77 sup thinning/OS: 73 sup thinning HVF(9/26/22): OD: MD -24, general depression - worse / OS: MD -3, wnl
C/D: .9/.75 Better seeing eye: OS
IOP Today: 19/12 Plan: IOP is still above target and unchanged OD since starting Rhopressa. Discussed risks and benefits of diode and tube shunt OD. Discussed trying one more drop as well. Drops: STOP Rhopressa, START Brimonidine BID OD, CONTINUE Dorzolamide-Timolol BID OU, and Latanoprost QHS OU. Patient to use drops as directed, EVEN on mornings of appointments. Poor compliance can lead to blindness. Discussed natural history of glaucoma and that irreversible vision loss can occur without adequate IOP control.

## 2023-02-20 ENCOUNTER — OFFICE VISIT (OUTPATIENT)
Dept: URBAN - METROPOLITAN AREA CLINIC 10 | Facility: CLINIC | Age: 78
End: 2023-02-20
Payer: MEDICARE

## 2023-02-20 DIAGNOSIS — H40.1421 CAPSLR GLAUCOMA W/PSEUDOEF LENS, LEFT EYE, MILD STAGE: ICD-10-CM

## 2023-02-20 DIAGNOSIS — H40.1413 CAPSLR GLAUCOMA W/PSUEDOXF LENS, RIGHT EYE, SEVERE STAGE: Primary | ICD-10-CM

## 2023-02-20 PROCEDURE — 92020 GONIOSCOPY: CPT | Performed by: STUDENT IN AN ORGANIZED HEALTH CARE EDUCATION/TRAINING PROGRAM

## 2023-02-20 PROCEDURE — 99214 OFFICE O/P EST MOD 30 MIN: CPT | Performed by: STUDENT IN AN ORGANIZED HEALTH CARE EDUCATION/TRAINING PROGRAM

## 2023-02-20 RX ORDER — OFLOXACIN 3 MG/ML
0.3 % SOLUTION/ DROPS OPHTHALMIC
Qty: 5 | Refills: 0 | Status: ACTIVE
Start: 2023-02-20

## 2023-02-20 RX ORDER — PREDNISOLONE ACETATE 10 MG/ML
1 % SUSPENSION/ DROPS OPHTHALMIC
Qty: 10 | Refills: 2 | Status: ACTIVE
Start: 2023-02-20

## 2023-02-20 ASSESSMENT — INTRAOCULAR PRESSURE
OD: 32
OS: 13

## 2023-02-20 NOTE — IMPRESSION/PLAN
Impression: Capslr glaucoma w/psuedoxf lens, right eye, severe stage: H40.1413.
2 Plan: Glaucoma Tube Shunt is recommended. Glaucoma diagnosis and associated vision loss discussed at length. R/B/A of surgery discussed including pain, bleeding, loss of vision, loss of eye, need for further surgery, double vision, retinal problems, infection, and inflammation. Although surgery is expected to improve the condition, the patient understands it is possible that the condition could worsen in the future. The surgery is being done in an attempt to preserve the current level of vision; vision improvement is not expected. Discussed in detail the commitment of post operative care, patient may need to travel to different offices. Discussed post-op activity restrictions: no bending head below waist, rubbing the eye, straining or lifting over 10 lbs, no swimming, stay out of lazaro, dirty areas and shield when sleeping until advised. All questions answered. Pt understand and wishes to proceed. ***Patient on aspirin - Discussion with patient to stop 10 days prior to surgery to reduce bleeding risks**

- ERx'd Ofloxacin QID OD, and Prednisolone QID OD as requested. - Continue Glaucoma medications as directed before surgery. After surgery, patient will stop glaucoma drops ONLY in the surgical eye. Start surgical drops when eye patch is removed 5hrs after surgery.

## 2023-02-20 NOTE — IMPRESSION/PLAN
Impression: Capslr glaucoma w/psuedoxf lens, right eye, severe stage: H40.1413. Med Allergies: none 1 Plan: Ocular Surgical History: s/p Phaco/Hydrus OU, SLT OD Pachy: 532/540 Tmax: 36/32 Target IOP: low teens OD & mid teens OS Heart / Lung / Kidney disease/sulfa allergy: Pt. denies Gonioscopy: Grade 4, 3+ TMP OU - nasal hydrus OD
OCT (5/25/21): OD: 77 sup thinning/OS: 73 sup thinning HVF(9/26/22): OD: MD -24, general depression - worse / OS: MD -3, wnl
C/D: .95/.75 Better seeing eye: OS
IOP Today: 32/13 Plan: IOP is elevated OD. Recommend Tube to lower IOP. Drops: CONTINUE  Brimonidine BID OD,  Dorzolamide-Timolol BID OU, and Latanoprost QHS OU. Patient to use drops as directed, EVEN on mornings of appointments. Poor compliance can lead to blindness. Discussed natural history of glaucoma and that irreversible vision loss can occur without adequate IOP control.

## 2023-04-03 ENCOUNTER — ADULT PHYSICAL (OUTPATIENT)
Dept: URBAN - METROPOLITAN AREA CLINIC 10 | Facility: CLINIC | Age: 78
End: 2023-04-03
Payer: MEDICARE

## 2023-04-03 DIAGNOSIS — H40.1413 CAPSULAR GLAUCOMA WITH PSEUDOEXFOLIATION OF LENS, RIGHT EYE, SEVERE STAGE: ICD-10-CM

## 2023-04-03 DIAGNOSIS — Z01.818 ENCOUNTER FOR OTHER PREPROCEDURAL EXAMINATION: Primary | ICD-10-CM

## 2023-04-03 PROCEDURE — 99213 OFFICE O/P EST LOW 20 MIN: CPT | Performed by: PHYSICIAN ASSISTANT

## 2023-04-10 ENCOUNTER — SURGERY (OUTPATIENT)
Dept: URBAN - METROPOLITAN AREA SURGERY 5 | Facility: SURGERY | Age: 78
End: 2023-04-10
Payer: MEDICARE

## 2023-04-10 PROCEDURE — 66180 AQUEOUS SHUNT EYE W/GRAFT: CPT | Performed by: STUDENT IN AN ORGANIZED HEALTH CARE EDUCATION/TRAINING PROGRAM

## 2023-04-11 ENCOUNTER — POST-OPERATIVE VISIT (OUTPATIENT)
Dept: URBAN - METROPOLITAN AREA CLINIC 10 | Facility: CLINIC | Age: 78
End: 2023-04-11

## 2023-04-11 DIAGNOSIS — Z48.810 ENCOUNTER FOR SURGICAL AFTERCARE FOLLOWING SURGERY ON A SENSE ORGAN: Primary | ICD-10-CM

## 2023-04-11 PROCEDURE — 99024 POSTOP FOLLOW-UP VISIT: CPT | Performed by: OPTOMETRIST

## 2023-04-11 ASSESSMENT — INTRAOCULAR PRESSURE
OD: 16
OS: 14

## 2023-04-11 NOTE — IMPRESSION/PLAN
Impression: S/P AHMED Tube shunt (Valve/Implant) OD - 1 Day. Encounter for surgical aftercare following surgery on a sense organ  Z48.810. Plan: Continue all medications as prescribed. Start PO drops as prescribed.

## 2023-04-17 ENCOUNTER — OFFICE VISIT (OUTPATIENT)
Dept: URBAN - METROPOLITAN AREA CLINIC 10 | Facility: CLINIC | Age: 78
End: 2023-04-17
Payer: MEDICARE

## 2023-04-17 DIAGNOSIS — Z48.810 ENCOUNTER FOR SURGICAL AFTERCARE FOLLOWING SURGERY ON A SENSE ORGAN: Primary | ICD-10-CM

## 2023-04-17 PROCEDURE — 99024 POSTOP FOLLOW-UP VISIT: CPT | Performed by: STUDENT IN AN ORGANIZED HEALTH CARE EDUCATION/TRAINING PROGRAM

## 2023-04-17 ASSESSMENT — INTRAOCULAR PRESSURE
OD: 7
OS: 14

## 2023-04-17 NOTE — IMPRESSION/PLAN
Impression: S/P AHMED Tube shunt (Valve/Implant) OD - 1 Day. Encounter for surgical aftercare following surgery on a sense organ  Z48.810. PXF sev OD, mild OS Plan: Ocular Surgical History: s/p Phaco/Hydrus OU, SLT OD Pachy: 532/540 Tmax: 36/32 Target IOP: low teens OD & mid teens OS Heart / Lung / Kidney disease/sulfa allergy: Pt. denies Gonioscopy: Grade 4, 3+ TMP OU - nasal hydrus OD
OCT (5/25/21): OD: 77 sup thinning/OS: 73 sup thinning HVF(9/26/22): OD: MD -24, general depression - worse / OS: MD -3, wnl
C/D: .95/.75 Better seeing eye: OS
IOP Today: 7/14 Plan: IOP is lower OU s/p Ahmed Tube Shunt OD. Bleb is janny negative. Healing well. No choroidals present. Dfolds on cornea, steroid should resolve and improve VA. Will continue to monitor.
- Discussed surgery in detail with patient. Post-Operative instructions reviewed with patient. Discussed activity restrictions including no bending head below waist, rubbing the eye, straining or lifting over 10 lbs. , stay out of lazaro, dirty areas and shield at night continue until further notice. Patient advised to call with any RSVP (redness, sensitivity to light, vision change, pain worse than today), call 24/7. Drops: STOP Brim. CHANGE Dorzolamide-Timolol BID OU to OS, and Latanoprost QHS OU to OS, CONTINUE  Pred TID taper weekly, Oflox QID OD(until runs out). Drop sheet given to patient. Patient to use drops as directed, EVEN on mornings of appointments. Poor compliance can lead to blindness. Discussed natural history of glaucoma and that irreversible vision loss can occur without adequate IOP control.

## 2023-05-01 ENCOUNTER — OFFICE VISIT (OUTPATIENT)
Dept: URBAN - METROPOLITAN AREA CLINIC 10 | Facility: CLINIC | Age: 78
End: 2023-05-01
Payer: MEDICARE

## 2023-05-01 DIAGNOSIS — Z48.810 ENCOUNTER FOR SURGICAL AFTERCARE FOLLOWING SURGERY ON A SENSE ORGAN: Primary | ICD-10-CM

## 2023-05-01 PROCEDURE — 99024 POSTOP FOLLOW-UP VISIT: CPT | Performed by: STUDENT IN AN ORGANIZED HEALTH CARE EDUCATION/TRAINING PROGRAM

## 2023-05-01 ASSESSMENT — INTRAOCULAR PRESSURE
OD: 15
OS: 12

## 2023-05-01 NOTE — IMPRESSION/PLAN
Impression: S/P AHMED Tube shunt (Valve/Implant) OD - 1 Day. Encounter for surgical aftercare following surgery on a sense organ  Z48.810. PXF sev OD, mild OS Plan: Ocular Surgical History: s/p Phaco/Hydrus OU, SLT OD Pachy: 532/540 Tmax: 36/32 Target IOP: low teens OD & mid teens OS Heart / Lung / Kidney disease/sulfa allergy: Pt. denies Gonioscopy: Grade 4, 3+ TMP OU - nasal hydrus OD
OCT (5/25/21): OD: 77 sup thinning/OS: 73 sup thinning HVF(9/26/22): OD: MD -24, general depression - worse / OS: MD -3, wnl
C/D: .95/.75 Better seeing eye: OS
IOP Today: 15/12 Plan: IOP is higher OD s/p Ahmed Tube Shunt OD. Bleb is janny negative. Healing well. Suspect in hypertensive phase. Will start steroid taper and add another drop to lower IOP. Will continue to monitor.
- activity restrictions no bending head below waist, rubbing the eye, straining or lifting over 10 lbs. , stay out of lazaro, dirty areas and shield at night continue until further notice. Patient advised to call with any RSVP (redness, sensitivity to light, vision change, pain worse than today), call 24/7. Drops: CHANGE Dorzolamide-Timolol BID OS to OU Pred TID to BID OD and CONTINUE Latanoprost QHS OS Drop sheet given to patient. Patient to use drops as directed, EVEN on mornings of appointments. Poor compliance can lead to blindness. Discussed natural history of glaucoma and that irreversible vision loss can occur without adequate IOP control.

## 2023-05-15 ENCOUNTER — POST-OPERATIVE VISIT (OUTPATIENT)
Dept: URBAN - METROPOLITAN AREA CLINIC 10 | Facility: CLINIC | Age: 78
End: 2023-05-15
Payer: MEDICARE

## 2023-05-15 DIAGNOSIS — Z48.810 ENCOUNTER FOR SURGICAL AFTERCARE FOLLOWING SURGERY ON A SENSE ORGAN: Primary | ICD-10-CM

## 2023-05-15 PROCEDURE — 99024 POSTOP FOLLOW-UP VISIT: CPT | Performed by: STUDENT IN AN ORGANIZED HEALTH CARE EDUCATION/TRAINING PROGRAM

## 2023-05-15 ASSESSMENT — INTRAOCULAR PRESSURE
OD: 15
OS: 11

## 2023-05-15 NOTE — IMPRESSION/PLAN
Impression: S/P AHMED Tube shunt (Valve/Implant) OD. Encounter for surgical aftercare following surgery on a sense organ  Z48.810. PSX sev OD, mild OS Plan: Ocular Surgical History: s/p Phaco/Hydrus OU, SLT OD, Ahmed OD Pachy: 532/540 Tmax: 36/32 Target IOP: low teens OD & mid teens OS Heart / Lung / Kidney disease/sulfa allergy: Pt. denies Gonioscopy: Grade 4, 3+ TMP OU - nasal hydrus OD
OCT: 77 sup thinning/73 sup thinning HVF: OD: MD -24, general depression - worse / OS: MD -3, wnl
C/D: .95/.75 Better seeing eye: OS
IOP Today: 15/11 Plan: IOP is stable. Doing well s/p tube OD.
- activity restrictions still in place until further notice. Drops: CONTINUE Dorzolamide-Timolol BID OU, Latanoprost QHS OS, and DECREASE Prednisolone from BID to QD OD x 1 week, then stop.

## 2023-06-23 ENCOUNTER — OFFICE VISIT (OUTPATIENT)
Dept: URBAN - METROPOLITAN AREA CLINIC 10 | Facility: CLINIC | Age: 78
End: 2023-06-23
Payer: MEDICARE

## 2023-06-23 DIAGNOSIS — H40.1421 CAPSLR GLAUCOMA W/PSEUDOEF LENS, LEFT EYE, MILD STAGE: ICD-10-CM

## 2023-06-23 DIAGNOSIS — H40.1413 CAPSLR GLAUCOMA W/PSUEDOXF LENS, RIGHT EYE, SEVERE STAGE: Primary | ICD-10-CM

## 2023-06-23 PROCEDURE — 92133 CPTRZD OPH DX IMG PST SGM ON: CPT | Performed by: STUDENT IN AN ORGANIZED HEALTH CARE EDUCATION/TRAINING PROGRAM

## 2023-06-23 PROCEDURE — 99213 OFFICE O/P EST LOW 20 MIN: CPT | Performed by: STUDENT IN AN ORGANIZED HEALTH CARE EDUCATION/TRAINING PROGRAM

## 2023-06-23 PROCEDURE — 92083 EXTENDED VISUAL FIELD XM: CPT | Performed by: STUDENT IN AN ORGANIZED HEALTH CARE EDUCATION/TRAINING PROGRAM

## 2023-06-23 RX ORDER — LATANOPROST 50 UG/ML
0.005 % SOLUTION OPHTHALMIC
Qty: 7.5 | Refills: 3 | Status: ACTIVE
Start: 2023-06-23

## 2023-06-23 RX ORDER — DORZOLAMIDE HYDROCHLORIDE AND TIMOLOL MALEATE 20; 5 MG/ML; MG/ML
SOLUTION/ DROPS OPHTHALMIC
Qty: 30 | Refills: 3 | Status: ACTIVE
Start: 2023-06-23

## 2023-06-23 ASSESSMENT — INTRAOCULAR PRESSURE
OD: 16
OS: 14

## 2023-06-23 NOTE — IMPRESSION/PLAN
Impression: Capslr glaucoma w/psuedoxf lens, right eye, severe stage: H40.1413. Med Allergies: none Plan: Ocular Surgical History: s/p Phaco/Hydrus OU, SLT OD, Ahmed OD Pachy: 532/540 Tmax: 36/32 Target IOP: low teens OD & mid teens OS Heart / Lung / Kidney disease/sulfa allergy: Pt. denies Gonioscopy: Grade 4, 3+ TMP OU - nasal hydrus OD
OCT: 36 poor scan/artifact/72 sup thinning- stable os HVF: Unable OD/OS: MD -2 grossly full C/D: .95/.75 Better seeing eye: OS
IOP Today: 16/14 Plan: IOP is stable and testing is stable. No changes are being made to treatment at this time. Will continue to monitor. Drops: Dorzolamide-Timolol BID OU, and Latanoprost QHS OU Patient to use drops as directed, EVEN on mornings of appointments. Poor compliance can lead to blindness. Discussed natural history of glaucoma and that irreversible vision loss can occur without adequate IOP control.

## 2023-10-16 ENCOUNTER — OFFICE VISIT (OUTPATIENT)
Dept: URBAN - METROPOLITAN AREA CLINIC 10 | Facility: CLINIC | Age: 78
End: 2023-10-16
Payer: MEDICARE

## 2023-10-16 DIAGNOSIS — H40.1421 CAPSLR GLAUCOMA W/PSEUDOEF LENS, LEFT EYE, MILD STAGE: ICD-10-CM

## 2023-10-16 DIAGNOSIS — H40.1413 CAPSLR GLAUCOMA W/PSUEDOXF LENS, RIGHT EYE, SEVERE STAGE: Primary | ICD-10-CM

## 2023-10-16 DIAGNOSIS — H16.223 KERATOCONJUNCTIVITIS SICCA, BILATERAL: ICD-10-CM

## 2023-10-16 PROCEDURE — 99213 OFFICE O/P EST LOW 20 MIN: CPT | Performed by: STUDENT IN AN ORGANIZED HEALTH CARE EDUCATION/TRAINING PROGRAM

## 2023-10-16 RX ORDER — BRIMONIDINE TARTRATE 2 MG/ML
0.2 % SOLUTION/ DROPS OPHTHALMIC
Qty: 30 | Refills: 2 | Status: ACTIVE
Start: 2023-10-16

## 2023-10-16 ASSESSMENT — INTRAOCULAR PRESSURE
OD: 17
OS: 14

## 2024-01-16 ENCOUNTER — OFFICE VISIT (OUTPATIENT)
Dept: URBAN - METROPOLITAN AREA CLINIC 10 | Facility: CLINIC | Age: 79
End: 2024-01-16
Payer: MEDICARE

## 2024-01-16 DIAGNOSIS — H40.1421 CAPSLR GLAUCOMA W/PSEUDOEF LENS, LEFT EYE, MILD STAGE: ICD-10-CM

## 2024-01-16 DIAGNOSIS — H40.1413 CAPSLR GLAUCOMA W/PSUEDOXF LENS, RIGHT EYE, SEVERE STAGE: Primary | ICD-10-CM

## 2024-01-16 DIAGNOSIS — H16.223 KERATOCONJUNCTIVITIS SICCA, BILATERAL: ICD-10-CM

## 2024-01-16 PROCEDURE — 99214 OFFICE O/P EST MOD 30 MIN: CPT | Performed by: STUDENT IN AN ORGANIZED HEALTH CARE EDUCATION/TRAINING PROGRAM

## 2024-01-16 ASSESSMENT — INTRAOCULAR PRESSURE
OD: 14
OS: 13
OD: 10
OS: 10

## 2024-04-16 ENCOUNTER — OFFICE VISIT (OUTPATIENT)
Dept: URBAN - METROPOLITAN AREA CLINIC 10 | Facility: CLINIC | Age: 79
End: 2024-04-16
Payer: MEDICARE

## 2024-04-16 DIAGNOSIS — H40.1413 CAPSLR GLAUCOMA W/PSUEDOXF LENS, RIGHT EYE, SEVERE STAGE: Primary | ICD-10-CM

## 2024-04-16 DIAGNOSIS — H40.1421 CAPSLR GLAUCOMA W/PSEUDOEF LENS, LEFT EYE, MILD STAGE: ICD-10-CM

## 2024-04-16 DIAGNOSIS — H16.223 KERATOCONJUNCTIVITIS SICCA, BILATERAL: ICD-10-CM

## 2024-04-16 PROCEDURE — 99214 OFFICE O/P EST MOD 30 MIN: CPT | Performed by: STUDENT IN AN ORGANIZED HEALTH CARE EDUCATION/TRAINING PROGRAM

## 2024-04-16 PROCEDURE — 92133 CPTRZD OPH DX IMG PST SGM ON: CPT | Performed by: STUDENT IN AN ORGANIZED HEALTH CARE EDUCATION/TRAINING PROGRAM

## 2024-04-16 ASSESSMENT — INTRAOCULAR PRESSURE
OS: 14
OD: 14

## 2024-08-01 ENCOUNTER — OFFICE VISIT (OUTPATIENT)
Dept: URBAN - METROPOLITAN AREA CLINIC 10 | Facility: CLINIC | Age: 79
End: 2024-08-01
Payer: MEDICARE

## 2024-08-01 DIAGNOSIS — H40.1421 CAPSLR GLAUCOMA W/PSEUDOEF LENS, LEFT EYE, MILD STAGE: ICD-10-CM

## 2024-08-01 DIAGNOSIS — H40.1413 CAPSLR GLAUCOMA W/PSUEDOXF LENS, RIGHT EYE, SEVERE STAGE: Primary | ICD-10-CM

## 2024-08-01 DIAGNOSIS — H16.223 KERATOCONJUNCTIVITIS SICCA, BILATERAL: ICD-10-CM

## 2024-08-01 PROCEDURE — 99214 OFFICE O/P EST MOD 30 MIN: CPT | Performed by: STUDENT IN AN ORGANIZED HEALTH CARE EDUCATION/TRAINING PROGRAM

## 2024-08-01 PROCEDURE — 92083 EXTENDED VISUAL FIELD XM: CPT | Performed by: STUDENT IN AN ORGANIZED HEALTH CARE EDUCATION/TRAINING PROGRAM

## 2024-08-01 ASSESSMENT — INTRAOCULAR PRESSURE
OD: 14
OS: 14
OS: 19
OD: 13

## 2024-12-06 ENCOUNTER — OFFICE VISIT (OUTPATIENT)
Dept: URBAN - METROPOLITAN AREA CLINIC 10 | Facility: CLINIC | Age: 79
End: 2024-12-06
Payer: MEDICARE

## 2024-12-06 DIAGNOSIS — H40.1413 CAPSLR GLAUCOMA W/PSUEDOXF LENS, RIGHT EYE, SEVERE STAGE: Primary | ICD-10-CM

## 2024-12-06 DIAGNOSIS — H43.313 VITREOUS MEMBRANES AND STRANDS, BILATERAL: ICD-10-CM

## 2024-12-06 DIAGNOSIS — H16.223 KERATOCONJUNCTIVITIS SICCA, BILATERAL: ICD-10-CM

## 2024-12-06 DIAGNOSIS — H40.1421 CAPSLR GLAUCOMA W/PSEUDOEF LENS, LEFT EYE, MILD STAGE: ICD-10-CM

## 2024-12-06 PROCEDURE — 92133 CPTRZD OPH DX IMG PST SGM ON: CPT | Performed by: STUDENT IN AN ORGANIZED HEALTH CARE EDUCATION/TRAINING PROGRAM

## 2024-12-06 PROCEDURE — 99214 OFFICE O/P EST MOD 30 MIN: CPT | Performed by: STUDENT IN AN ORGANIZED HEALTH CARE EDUCATION/TRAINING PROGRAM

## 2024-12-06 PROCEDURE — 92083 EXTENDED VISUAL FIELD XM: CPT | Performed by: STUDENT IN AN ORGANIZED HEALTH CARE EDUCATION/TRAINING PROGRAM

## 2024-12-06 RX ORDER — BRIMONIDINE TARTRATE 2 MG/ML
0.2 % SOLUTION/ DROPS OPHTHALMIC
Qty: 30 | Refills: 3 | Status: ACTIVE
Start: 2024-12-06

## 2024-12-06 ASSESSMENT — VISUAL ACUITY
OD: HM
OS: 20/20

## 2024-12-06 ASSESSMENT — INTRAOCULAR PRESSURE
OS: 15
OD: 15

## 2025-04-10 ENCOUNTER — OFFICE VISIT (OUTPATIENT)
Dept: URBAN - METROPOLITAN AREA CLINIC 10 | Facility: CLINIC | Age: 80
End: 2025-04-10
Payer: MEDICARE

## 2025-04-10 DIAGNOSIS — H40.1413 CAPSLR GLAUCOMA W/PSUEDOXF LENS, RIGHT EYE, SEVERE STAGE: Primary | ICD-10-CM

## 2025-04-10 DIAGNOSIS — H40.1421 CAPSLR GLAUCOMA W/PSEUDOEF LENS, LEFT EYE, MILD STAGE: ICD-10-CM

## 2025-04-10 DIAGNOSIS — H16.223 KERATOCONJUNCTIVITIS SICCA, BILATERAL: ICD-10-CM

## 2025-04-10 PROCEDURE — 99214 OFFICE O/P EST MOD 30 MIN: CPT | Performed by: STUDENT IN AN ORGANIZED HEALTH CARE EDUCATION/TRAINING PROGRAM

## 2025-04-10 RX ORDER — NETARSUDIL 0.2 MG/ML
0.02 % SOLUTION/ DROPS OPHTHALMIC; TOPICAL
Qty: 2.5 | Refills: 5 | Status: ACTIVE
Start: 2025-04-10

## 2025-04-10 ASSESSMENT — INTRAOCULAR PRESSURE
OS: 13
OD: 21
OD: 20

## 2025-08-07 ENCOUNTER — OFFICE VISIT (OUTPATIENT)
Dept: URBAN - METROPOLITAN AREA CLINIC 10 | Facility: CLINIC | Age: 80
End: 2025-08-07
Payer: MEDICARE

## 2025-08-07 DIAGNOSIS — H40.1413 CAPSLR GLAUCOMA W/PSUEDOXF LENS, RIGHT EYE, SEVERE STAGE: Primary | ICD-10-CM

## 2025-08-07 DIAGNOSIS — H40.1421 CAPSLR GLAUCOMA W/PSEUDOEF LENS, LEFT EYE, MILD STAGE: ICD-10-CM

## 2025-08-07 DIAGNOSIS — H16.223 KERATOCONJUNCTIVITIS SICCA, BILATERAL: ICD-10-CM

## 2025-08-07 PROCEDURE — 99213 OFFICE O/P EST LOW 20 MIN: CPT | Performed by: STUDENT IN AN ORGANIZED HEALTH CARE EDUCATION/TRAINING PROGRAM

## 2025-08-07 PROCEDURE — 92083 EXTENDED VISUAL FIELD XM: CPT | Performed by: STUDENT IN AN ORGANIZED HEALTH CARE EDUCATION/TRAINING PROGRAM

## 2025-08-07 ASSESSMENT — INTRAOCULAR PRESSURE
OD: 15
OS: 15